# Patient Record
Sex: MALE | Race: WHITE | NOT HISPANIC OR LATINO | Employment: OTHER | URBAN - METROPOLITAN AREA
[De-identification: names, ages, dates, MRNs, and addresses within clinical notes are randomized per-mention and may not be internally consistent; named-entity substitution may affect disease eponyms.]

---

## 2017-02-07 ENCOUNTER — TELEPHONE (OUTPATIENT)
Dept: FAMILY MEDICINE | Facility: CLINIC | Age: 57
End: 2017-02-07

## 2017-02-07 NOTE — Clinical Note
[facility]  [address] [facility] [address]  Phone: [phone]    February 9, 2017    Mane Bro  [address] [place] [id number]              Dear Mane,    Your most recent blood pressure reading performed at our clinic was higher than we like to see it. The goal is to have it under 140/90 in clinic. Please call our clinic [phone] to schedule a blood pressure recheck on our RN schedule. This appointment is free of charge and takes about 15 minutes to complete. Be sure to take all of your blood pressure medications, and avoid stimulants like caffeine, cold medicines, sudafed, or tobacco prior to your recheck.    If your blood pressure medication was changed by your provider recently wait 2 weeks before making this recheck appointment.     Thank you for trusting us with your health care.  Sincerely,    Sharmaine Mora's Care Team

## 2017-02-08 NOTE — TELEPHONE ENCOUNTER
Left message for patient to return a call to the clinic GISELLA.   LAUREN Porter RN    Pt is on the hypertension lists.  He was seen once on 11/14/16; pt saw Dr Cohen prior to that.    Please offer pt a free blood pressure check with the RN.    BP Readings from Last 3 Encounters:   11/14/16 142/94   08/16/13 149/97   09/24/12 139/88     CR     0.89   9/24/2012  POTASSIUM      4.8   9/24/2012    Rosa Porter RN

## 2017-05-31 ENCOUNTER — HOSPITAL ENCOUNTER (EMERGENCY)
Facility: CLINIC | Age: 57
Discharge: HOME | End: 2017-05-31
Attending: NURSE PRACTITIONER | Admitting: NURSE PRACTITIONER
Payer: OTHER MISCELLANEOUS

## 2017-05-31 ENCOUNTER — APPOINTMENT (OUTPATIENT)
Dept: GENERAL RADIOLOGY | Facility: CLINIC | Age: 57
End: 2017-05-31
Attending: NURSE PRACTITIONER
Payer: OTHER MISCELLANEOUS

## 2017-05-31 VITALS
WEIGHT: 165 LBS | RESPIRATION RATE: 16 BRPM | BODY MASS INDEX: 21.77 KG/M2 | OXYGEN SATURATION: 97 % | DIASTOLIC BLOOD PRESSURE: 76 MMHG | TEMPERATURE: 98.5 F | SYSTOLIC BLOOD PRESSURE: 161 MMHG

## 2017-05-31 DIAGNOSIS — S49.91XA RIGHT SHOULDER INJURY, INITIAL ENCOUNTER: Primary | ICD-10-CM

## 2017-05-31 PROCEDURE — 73030 X-RAY EXAM OF SHOULDER: CPT | Mod: RT

## 2017-05-31 PROCEDURE — 99213 OFFICE O/P EST LOW 20 MIN: CPT

## 2017-05-31 PROCEDURE — 99213 OFFICE O/P EST LOW 20 MIN: CPT | Performed by: NURSE PRACTITIONER

## 2017-05-31 RX ORDER — HYDROCODONE BITARTRATE AND ACETAMINOPHEN 5; 325 MG/1; MG/1
1-2 TABLET ORAL EVERY 4 HOURS PRN
Qty: 15 TABLET | Refills: 0 | Status: SHIPPED | OUTPATIENT
Start: 2017-05-31 | End: 2017-08-25

## 2017-05-31 RX ORDER — AMLODIPINE BESYLATE 5 MG/1
5 TABLET ORAL DAILY
COMMUNITY
Start: 2016-11-15 | End: 2017-08-25

## 2017-05-31 RX ORDER — INDOMETHACIN 50 MG/1
50 CAPSULE ORAL PRN
COMMUNITY
Start: 2017-02-22

## 2017-05-31 NOTE — ED PROVIDER NOTES
History     Chief Complaint   Patient presents with     Shoulder Injury     rt shoulder injured yesterday at work lifting     HPI  Mane Bro is a 57 year old male who presents with right shoulder injury that occurred yesterday afternoon.  Pt is an .  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  Pt reports that he grabbed the muir with the manager and lifted it back up; then the muir slipped again and he felt immediate sharp pain on the inside of right shoulder.  Pt reports pain is getting worse and having difficulty with lifting.  Pt denies numbness and tingling of extremities.  Pt states at rest the pain level 3-4/10 and when using his arm the pain level is 12/10.   Pt has been taking 6 ibuprofen bid for pain management and this is not working well. Pt describes the pain as sharp, stabbing, throbbing and radiating down back.      I have reviewed the Medications, Allergies, Past Medical and Surgical History, and Social History in the Epic system.    Review of Systems   ROS: 10 point ROS neg other than the symptoms noted above in the HPI.    Physical Exam   /76  Temp 98.5  F (36.9  C)  Resp 16  Wt 74.8 kg (165 lb)  SpO2 97%  BMI 21.77 kg/m2    Physical Exam  Exam:  Constitutional: healthy, alert, no distress and moderate distress  Head: Normocephalic. No masses, lesions, tenderness or abnormalities  Neck: Neck supple. No adenopathy. Thyroid symmetric, normal size,  Cardiovascular: negative, PMI normal. No lifts, heaves, or thrills. RRR. No murmurs, clicks gallops or rub  Respiratory: negative, negative findings: normal respiratory rate and rhythm, lungs clear to auscultation  Musculoskeletal: extremities normal- no gross deformities noted, gait normal, normal muscle tone, normal range of motion to left should and right shoulder extension is limited, overhead reach is approximately 60 degrees, lift off  test is positive, tender to palpation anterior should with movement and posterior shoulder is tender with movement and AC joint is non tender and decreased strength to right arm compared to left  Skin: no suspicious lesions or rashes  Neurologic: Gait normal. Reflexes normal and symmetric. Sensation grossly WNL.  Psychiatric: mentation appears normal and affect normal/bright    ED Course     ED Course     Procedures    Critical Care time:  none     Labs Ordered and Resulted from Time of ED Arrival Up to the Time of Departure from the ED - No data to display   Results for orders placed or performed during the hospital encounter of 05/31/17   Shoulder XR, 2 view, right    Narrative    XR SHOULDER 2 VIEW RIGHT 5/31/2017 1:12 PM    HISTORY: Injury. Pain.    COMPARISON: None.      Impression    IMPRESSION: 2 views of the right shoulder show no fracture or  dislocation.     CHANTAL LYNCH MD       Assessments & Plan (with Medical Decision Making)     I have reviewed the nursing notes.    I have reviewed the findings, diagnosis, plan and need for follow up with the patient.  57 year old presents with 24 hours acute right shoulder injury work related.  ROM and strength decreased; neurovasculature intact.  Xrays appear negative.  Suspect rotator cuff pathology due to positive lift off on exam and remaining exam findings but cannot exclude other ligamentous or muscle pathology.  Referral to ortho initiated.  Off work until seen by ortho.  Given 3 day narcotic prescription for pain management.    Differential AC joint separation    New Prescriptions    HYDROCODONE-ACETAMINOPHEN (NORCO) 5-325 MG PER TABLET    Take 1-2 tablets by mouth every 4 hours as needed for moderate to severe pain       Final diagnoses:   Right shoulder injury, initial encounter       5/31/2017   [facility] EMERGENCY DEPARTMENT     Sharmaine White, KARON CNP  05/31/17 1400

## 2017-05-31 NOTE — ED AVS SNAPSHOT
[facility] Emergency Department    [address] [facility] [address]    Phone:  [phone]    Fax:  [phone]                                       Mane Bro   MRN: [id number]    Department:  [facility] Emergency Department   Date of Visit:  5/31/2017           After Visit Summary Signature Page     I have received my discharge instructions, and my questions have been answered. I have discussed any challenges I see with this plan with the nurse or doctor.    ..........................................................................................................................................  Patient/Patient Representative Signature      ..........................................................................................................................................  Patient Representative Print Name and Relationship to Patient    ..................................................               ................................................  Date                                            Time    ..........................................................................................................................................  Reviewed by Signature/Title    ...................................................              ..............................................  Date                                                            Time

## 2017-05-31 NOTE — ED AVS SNAPSHOT
[facility] Emergency Department    [address] [facility] [address]    Phone:  [phone]    Fax:  [phone]                                       Mane Bro   MRN: [id number]    Department:  [facility] Emergency Department   Date of Visit:  5/31/2017           Patient Information     Date Of Birth          1960        Your diagnoses for this visit were:     Right shoulder injury, initial encounter        You were seen by Sharmaine White APRN CNP.      Follow-up Information     Follow up In 1 day.    Why:  as directed by ortho concierage      Discharge References/Attachments     ROTATOR CUFF INJURIES, UNDERSTANDING (ENGLISH)      24 Hour Appointment Hotline       To make an appointment at any [facility] clinic, call [phone] ([phone]). If you don't have a family doctor or clinic, we will help you find one. [facility] clinics are conveniently located to serve the needs of you and your family.          ED Discharge Orders     ORTHO  REFERRAL       [facility] is referring you to the Orthopedic  Services at [facility].       The  Representative will assist you in the coordination of your Orthopedic and Musculoskeletal Care as prescribed by your physician.    The  Representative will call you within 1 business day to help schedule your appointment, or you may contact the  Representative at:    All areas ~ [phone]     Type of Referral : Surgical / Specialist  Possible rotator cuff        Timeframe requested: 1 - 2 days    Coverage of these services is subject to the terms and limitations of your health insurance plan.  Please call member services at your health plan with any benefit or coverage questions.      If X-rays, CT or MRI's have been performed, please contact the facility where they were done to arrange for , prior to your scheduled appointment.  Please bring this  referral request to your appointment and present it to your specialist.                     Review of your medicines      START taking        Dose / Directions Last dose taken    HYDROcodone-acetaminophen 5-325 MG per tablet   Commonly known as:  NORCO   Dose:  1-2 tablet   Quantity:  15 tablet        Take 1-2 tablets by mouth every 4 hours as needed for moderate to severe pain   Refills:  0          Our records show that you are taking the medicines listed below. If these are incorrect, please call your family doctor or clinic.        Dose / Directions Last dose taken    albuterol 90 MCG/ACT inhaler   Dose:  2 puff   Quantity:  1 Inhaler        Inhale 2 puffs into the lungs every 4 hours as needed for shortness of breath / dyspnea.   Refills:  0        amLODIPine 5 MG tablet   Commonly known as:  NORVASC   Dose:  5 mg        Take 5 mg by mouth daily   Refills:  0        ibuprofen 200 MG tablet   Commonly known as:  ADVIL/MOTRIN   Dose:  200 mg        Take 200 mg by mouth every 4 hours as needed   Refills:  0        indomethacin 50 MG capsule   Commonly known as:  INDOCIN   Dose:  50 mg        50 mg by Oral or NG Tube route as needed for pain   Refills:  0        metFORMIN 500 MG tablet   Commonly known as:  GLUCOPHAGE   Dose:  500 mg   Quantity:  60 tablet        Take 1 tablet by mouth 2 times daily (with meals).   Refills:  0        PRILOSEC OTC PO   Dose:  20 mg        Take 20 mg by mouth every other day. On odd days   Refills:  0        VIAGRA 100 MG cap/tab   Quantity:  10 Tab   Generic drug:  sildenafil        1/2 - 1  TABLET TAKEN AT LEAST 30 MINUTES BEFORE INTERCOURSE - max 100 mg in 24 hours   Refills:  1 YEAR                Prescriptions were sent or printed at these locations (1 Prescription)                   Other Prescriptions                Printed at Department/Unit printer (1 of 1)         HYDROcodone-acetaminophen (NORCO) 5-325 MG per tablet                Procedures and tests performed during your  "visit     Shoulder XR, 2 view, right      Orders Needing Specimen Collection     None      Pending Results     No orders found from 2017 to 2017.            Pending Culture Results     No orders found from 2017 to 2017.            Pending Results Instructions     If you had any lab results that were not finalized at the time of your Discharge, you can call the ED Lab Result RN at [phone]. You will be contacted by this team for any positive Lab results or changes in treatment. The nurses are available 7 days a week from 10A to 6:30P.  You can leave a message 24 hours per day and they will return your call.        Test Results From Your Hospital Stay        2017  1:42 PM      Narrative     XR SHOULDER 2 VIEW RIGHT 2017 1:12 PM    HISTORY: Injury. Pain.    COMPARISON: None.        Impression     IMPRESSION: 2 views of the right shoulder show no fracture or  dislocation.     CHANTAL LYNCH MD                Thank you for choosing [facility]       Thank you for choosing [facility] for your care. Our goal is always to provide you with excellent care. Hearing back from our patients is one way we can continue to improve our services. Please take a few minutes to complete the written survey that you may receive in the mail after you visit with us. Thank you!        [facility] [employer] Information     [employer] lets you send messages to your doctor, view your test results, renew your prescriptions, schedule appointments and more. To sign up, go to [email or web address] . Click on \"Log in\" on the left side of the screen, which will take you to the Welcome page. Then click on \"Sign up Now\" on the right side of the page.     You will be asked to enter the access code listed below, as well as some personal information. Please follow the directions to create your username and password.     Your access code is: [email or web address]  Expires: 2017  1:52 PM     Your access code will  in 90 days. If you need help or " a new code, please call your [facility] clinic or [phone].        Care EveryWhere ID     This is your Care EveryWhere ID. This could be used by other organizations to access your [facility] medical records  [id number]        After Visit Summary       This is your record. Keep this with you and show to your community pharmacist(s) and doctor(s) at your next visit.

## 2017-06-21 ENCOUNTER — HOSPITAL ENCOUNTER (OUTPATIENT)
Dept: PHYSICAL THERAPY | Facility: CLINIC | Age: 57
Setting detail: THERAPIES SERIES
End: 2017-06-21
Attending: ORTHOPAEDIC SURGERY
Payer: OTHER MISCELLANEOUS

## 2017-06-21 PROCEDURE — 40000718 ZZHC STATISTIC PT DEPARTMENT ORTHO VISIT: Performed by: PHYSICAL THERAPIST

## 2017-06-21 PROCEDURE — 97161 PT EVAL LOW COMPLEX 20 MIN: CPT | Mod: GP | Performed by: PHYSICAL THERAPIST

## 2017-06-21 NOTE — PROGRESS NOTES
Mane Bro   PHYSICAL THERAPY EVALUATION/[facility]    06/21/17 0700   General Information   Type of Visit Initial OP Ortho PT Evaluation   Start of Care Date 06/21/17   Referring Physician DR. Nuñez   Patient/Family Goals Statement get back to workl   Orders Evaluate and Treat   Date of Order 06/13/17   Insurance Type Other   Insurance Comments/Visits Authorized work comp   Medical Diagnosis R shoulder tendonitis   Body Part(s)   Body Part(s) Shoulder   Presentation and Etiology   Pertinent history of current problem (include personal factors and/or comorbidities that impact the POC) Lifting a 100# car muir with manager at work, he dropped his side, then lifted it again and the muir slipped.  Felt immediate pain inside R shoulder..  Did get injection 6/13 and shoulder feels back to 100%.  Paints cars, paint sprayer weighs about 10#.  HAs been off 2 wks, then working light duty, feels he could do his job fully..  did have MRI off site, showed partial tear..  Minimal pain now.    Onset date of current episode/exacerbation 05/30/17   Prior Level of Function   Functional Level Prior Comment work, indep living, fish   Current Level of Function   Patient role/employment history A. Employed   Employment Comments    Fall Risk Screen   Fall screen completed by PT   Per patient - Fall 2 or more times in past year? No   Per patient - Fall with injury in past year? No   Is patient a fall risk? No   Shoulder Objective Findings   Side (if bilateral, select both right and left) Right   Posture normal   Cervical Screen (ROM, quadrant) WNL   Shoulder Flexibility Comments no pain with any testing   Andrew-Horacio Test neg   Coracoid Test neg   Shoulder Special Tests Comments empty can neg   Right Shoulder Flexion AROM 150* B   Right Shoulder Flexion PROM 155* B   Right Shoulder Abduction AROM 140*B   Right Shoulder ER PROM 80* at 45abd   Right Shoulder IR PROM WNL behind back   Right Shoulder Flexion Strength  5   Right Shoulder Abduction Strength 5   Right Shoulder ER Strength 5   Right Shoulder IR Strength 5   Right Shoulder Extension Strength 5   Right Mid Trapezius Strength 5   Planned Therapy Interventions   Planned Therapy Interventions strengthening   Clinical Impression   Criteria for Skilled Therapeutic Interventions Met yes, treatment indicated   PT Diagnosis R shoulder injury, min pain   Functional limitations due to impairments none currently, not yet released to full duty   Clinical Presentation Stable/Uncomplicated   Clinical Decision Making (Complexity) Low complexity   Therapy Frequency 1 time/week   Predicted Duration of Therapy Intervention (days/wks) 1 visit   Risk & Benefits of therapy have been explained Yes   Patient, Family & other staff in agreement with plan of care Yes   Education Assessment   Preferred Learning Style Listening   Barriers to Learning No barriers   ORTHO GOALS   PT Ortho Eval Goals 1   Ortho Goal 1   Goal Description pt to be release to 25# lifting or full duty in 2wks   Target Date 07/05/17   Total Evaluation Time   Total Evaluation Time 12   Kris Hoenk, PT #[id number]  [facility]

## 2017-08-02 ENCOUNTER — TELEPHONE (OUTPATIENT)
Dept: FAMILY MEDICINE | Facility: CLINIC | Age: 57
End: 2017-08-02

## 2017-08-02 NOTE — LETTER
August 16, 2017        Mane Bro  [address] [place] [phone]        Dear Mane,     At this time you are due for a Colon Cancer Screening. Here is some information regarding this testing.     Recommended every 5-10 years, depending on your history, in order to prevent and detect colon cancer at its earliest stages.  Colon cancer is now the second leading cause of death in the [place] for both men and women and there are over 130,000 new cases and 50,000 deaths per year from colon cancer.  Colonoscopies can prevent 90-95% of these deaths.  Problem lesions can be removed before they ever become cancer. This test is not only looking for cancer, but also getting rid of precancerious lesions. You are usually given some sedation which makes the test very comfortable for most people.      If you do not wish to do a colonoscopy or cannot afford to do one, at this time, there is another option. It is called a FIT test or Fecal Immunochemical Occult Blood Test (take home stool sample kit).  It does not replace the colonoscopy for colorectal cancer screening, but it can detect hidden bleeding in the lower colon.  It does need to be repeated every year and if a positive result is obtained, you would be referred for a colonoscopy. The FIT test is really easy to do and does not require any  diet or medication restrictions and involves only one collection sample.      If you have completed either one of these tests or had a flexible sigmoidoscopy in the past five years at another facility, please have the records sent to our clinic so that we can best coordinate your care and update your chart.  Please call us if you have questions or would like arrange either to do a colonoscopy or obtain the necessary test kit for the Fecal Occult Test.        Sincerely,  KARON Britt CNP, APRN CNP

## 2017-08-02 NOTE — TELEPHONE ENCOUNTER
Pt is due for a colon cancer screening. Left a message for patient to return call.  Mague Rodriguez, CMA

## 2017-08-16 NOTE — TELEPHONE ENCOUNTER
Panel Management Review      Patient has the following on his problem list:     Diabetes    ASA: Failed    Last A1C  Lab Results   Component Value Date    A1C 5.5 09/19/2011     A1C tested: Passed    Last LDL:    No results found for: CHOL  No results found for: HDL  No results found for: LDL  No results found for: TRIG  No results found for: CHOLHDLRATIO  No results found for: NHDL    Is the patient on a Statin? NO             Is the patient on Aspirin? NO        Last three blood pressure readings:  BP Readings from Last 3 Encounters:   05/31/17 161/76   11/14/16 (!) 142/94   08/16/13 (!) 149/97       Date of last diabetes office visit: 11/14/2016     Tobacco History:     History   Smoking Status     Current Every Day Smoker     Packs/day: 0.10     Years: 40.00     Types: Cigarettes   Smokeless Tobacco     Never Used     Comment: 8/16/13 smoking about 3 cigs daily             Composite cancer screening  Chart review shows that this patient is due/due soon for the following Colonoscopy  Summary:    Patient is due/failing the following:   COLONOSCOPY    Action needed:   Due for Colon Cancer Screen    Type of outreach:    Sent letter.    Questions for provider review:    None                                                                                                                                    Og PICKARD CMA

## 2017-08-25 ENCOUNTER — OFFICE VISIT (OUTPATIENT)
Dept: FAMILY MEDICINE | Facility: CLINIC | Age: 57
End: 2017-08-25
Payer: COMMERCIAL

## 2017-08-25 VITALS
SYSTOLIC BLOOD PRESSURE: 188 MMHG | HEIGHT: 73 IN | HEART RATE: 94 BPM | DIASTOLIC BLOOD PRESSURE: 100 MMHG | BODY MASS INDEX: 20.63 KG/M2 | WEIGHT: 155.7 LBS | TEMPERATURE: 96.2 F

## 2017-08-25 DIAGNOSIS — I10 ESSENTIAL HYPERTENSION: ICD-10-CM

## 2017-08-25 DIAGNOSIS — E11.9 TYPE 2 DIABETES MELLITUS WITHOUT COMPLICATION, WITHOUT LONG-TERM CURRENT USE OF INSULIN (H): Primary | ICD-10-CM

## 2017-08-25 DIAGNOSIS — R63.4 WEIGHT LOSS: ICD-10-CM

## 2017-08-25 LAB
ANION GAP SERPL CALCULATED.3IONS-SCNC: 6 MMOL/L (ref 3–14)
BASOPHILS # BLD AUTO: 0 10E9/L (ref 0–0.2)
BASOPHILS NFR BLD AUTO: 0.6 %
BUN SERPL-MCNC: 8 MG/DL (ref 7–30)
CALCIUM SERPL-MCNC: 9.1 MG/DL (ref 8.5–10.1)
CHLORIDE SERPL-SCNC: 103 MMOL/L (ref 94–109)
CO2 SERPL-SCNC: 27 MMOL/L (ref 20–32)
CREAT SERPL-MCNC: 0.66 MG/DL (ref 0.66–1.25)
DIFFERENTIAL METHOD BLD: NORMAL
EOSINOPHIL # BLD AUTO: 0.3 10E9/L (ref 0–0.7)
EOSINOPHIL NFR BLD AUTO: 5.2 %
ERYTHROCYTE [DISTWIDTH] IN BLOOD BY AUTOMATED COUNT: 12.4 % (ref 10–15)
GFR SERPL CREATININE-BSD FRML MDRD: >90 ML/MIN/1.7M2
GLUCOSE SERPL-MCNC: 271 MG/DL (ref 70–99)
HBA1C MFR BLD: 13.1 % (ref 4.3–6)
HCT VFR BLD AUTO: 46.4 % (ref 40–53)
HGB BLD-MCNC: 15.9 G/DL (ref 13.3–17.7)
LYMPHOCYTES # BLD AUTO: 1.6 10E9/L (ref 0.8–5.3)
LYMPHOCYTES NFR BLD AUTO: 32.8 %
MCH RBC QN AUTO: 33 PG (ref 26.5–33)
MCHC RBC AUTO-ENTMCNC: 34.3 G/DL (ref 31.5–36.5)
MCV RBC AUTO: 96 FL (ref 78–100)
MONOCYTES # BLD AUTO: 0.5 10E9/L (ref 0–1.3)
MONOCYTES NFR BLD AUTO: 10.7 %
NEUTROPHILS # BLD AUTO: 2.5 10E9/L (ref 1.6–8.3)
NEUTROPHILS NFR BLD AUTO: 50.7 %
PLATELET # BLD AUTO: 162 10E9/L (ref 150–450)
POTASSIUM SERPL-SCNC: 4.5 MMOL/L (ref 3.4–5.3)
RBC # BLD AUTO: 4.82 10E12/L (ref 4.4–5.9)
SODIUM SERPL-SCNC: 136 MMOL/L (ref 133–144)
TSH SERPL DL<=0.005 MIU/L-ACNC: 0.74 MU/L (ref 0.4–4)
WBC # BLD AUTO: 4.9 10E9/L (ref 4–11)

## 2017-08-25 PROCEDURE — 99214 OFFICE O/P EST MOD 30 MIN: CPT | Performed by: NURSE PRACTITIONER

## 2017-08-25 PROCEDURE — 83036 HEMOGLOBIN GLYCOSYLATED A1C: CPT | Performed by: NURSE PRACTITIONER

## 2017-08-25 PROCEDURE — 85025 COMPLETE CBC W/AUTO DIFF WBC: CPT | Performed by: NURSE PRACTITIONER

## 2017-08-25 PROCEDURE — 80048 BASIC METABOLIC PNL TOTAL CA: CPT | Performed by: NURSE PRACTITIONER

## 2017-08-25 PROCEDURE — 36415 COLL VENOUS BLD VENIPUNCTURE: CPT | Performed by: NURSE PRACTITIONER

## 2017-08-25 PROCEDURE — 84443 ASSAY THYROID STIM HORMONE: CPT | Performed by: NURSE PRACTITIONER

## 2017-08-25 RX ORDER — AMLODIPINE BESYLATE 5 MG/1
5 TABLET ORAL DAILY
Qty: 30 TABLET | Refills: 1 | Status: SHIPPED | OUTPATIENT
Start: 2017-08-25

## 2017-08-25 NOTE — NURSING NOTE
"Chief Complaint   Patient presents with     Establish Care     Weight Problem     Has lost about 50 pounds the past 6 months and has not changed his diet.       Initial BP (!) 195/121  Pulse 94  Temp 96.2  F (35.7  C) (Tympanic)  Ht 6' 1\" (1.854 m)  Wt 155 lb 11.2 oz (70.6 kg)  BMI 20.54 kg/m2 Estimated body mass index is 20.54 kg/(m^2) as calculated from the following:    Height as of this encounter: 6' 1\" (1.854 m).    Weight as of this encounter: 155 lb 11.2 oz (70.6 kg).  Medication Reconciliation: complete  "

## 2017-08-25 NOTE — MR AVS SNAPSHOT
"              After Visit Summary   8/25/2017    Mane Bro    MRN: [id number]           Patient Information     Date Of Birth          1960        Visit Information        Provider Department      8/25/2017 6:40 AM Yesika Oliva APRN CNP [facility]        Today's Diagnoses     Type 2 diabetes mellitus without complication, without long-term current use of insulin (H)    -  1    Weight loss        Essential hypertension          Care Instructions    Labs: thyroid, kidney function, A1C, complete blood count.     Please restart Norvasc 5 mg daily and monitor your blood pressure, if still over 140/80, please follow up.           Follow-ups after your visit        Future tests that were ordered for you today     Open Future Orders        Priority Expected Expires Ordered    CBC with platelets differential Routine  9/25/2017 8/25/2017    XR Chest 2 Views Routine 8/25/2017 8/25/2018 8/25/2017            Who to contact     If you have questions or need follow up information about today's clinic visit or your schedule please contact [facility] directly at [phone].  Normal or non-critical lab and imaging results will be communicated to you by MyChart, letter or phone within 4 business days after the clinic has received the results. If you do not hear from us within 7 days, please contact the clinic through Citizen.VChart or phone. If you have a critical or abnormal lab result, we will notify you by phone as soon as possible.  Submit refill requests through BuyItRideIt or call your pharmacy and they will forward the refill request to us. Please allow 3 business days for your refill to be completed.          Additional Information About Your Visit        MyChart Information     [employer]It[employer]It lets you send messages to your doctor, view your test results, renew your prescriptions, schedule appointments and more. To sign up, go to [email or web address] . Click on \"Log in\" on the left side " "of the screen, which will take you to the Welcome page. Then click on \"Sign up Now\" on the right side of the page.     You will be asked to enter the access code listed below, as well as some personal information. Please follow the directions to create your username and password.     Your access code is: [email or web address]  Expires: 2017  1:52 PM     Your access code will  in 90 days. If you need help or a new code, please call your [place] clinic or [phone].        Care EveryWhere ID     This is your Care EveryWhere ID. This could be used by other organizations to access your [place] medical records  [id number]        Your Vitals Were     Pulse Temperature Height BMI (Body Mass Index)          94 96.2  F (35.7  C) (Tympanic) 6' 1\" (1.854 m) 20.54 kg/m2         Blood Pressure from Last 3 Encounters:   17 (!) 195/121   17 161/76   16 (!) 142/94    Weight from Last 3 Encounters:   17 155 lb 11.2 oz (70.6 kg)   17 165 lb (74.8 kg)   16 209 lb (94.8 kg)              We Performed the Following     Basic metabolic panel     Hemoglobin A1c     TSH with free T4 reflex          Where to get your medicines      These medications were sent to [facility] [address] [place] [address] [address]   [address] [facility] [address]     Phone:  [phone]     amLODIPine 5 MG tablet          Primary Care Provider    Provider Unknown       No address on file        Equal Access to Services     [facility] : Hadbatsheva Cadet, waaxda luqadaha, qaybta kaalhenrry silva. So [facility] [phone].    ATENCIÓN: Si habla español, tiene a pratt disposición servicios gratuitos de asistencia lingüística. Llame al [phone].    We comply with applicable federal civil rights laws and [place] laws. We do not discriminate on the basis of race, color, national origin, age, disability sex, sexual orientation or gender " identity.            Thank you!     Thank you for choosing [facility]  for your care. Our goal is always to provide you with excellent care. Hearing back from our patients is one way we can continue to improve our services. Please take a few minutes to complete the written survey that you may receive in the mail after your visit with us. Thank you!             Your Updated Medication List - Protect others around you: Learn how to safely use, store and throw away your medicines at www.disposemymeds.org.          This list is accurate as of: 8/25/17  7:04 AM.  Always use your most recent med list.                   Brand Name Dispense Instructions for use Diagnosis    albuterol 90 MCG/ACT inhaler     1 Inhaler    Inhale 2 puffs into the lungs every 4 hours as needed for shortness of breath / dyspnea.        amLODIPine 5 MG tablet    NORVASC    30 tablet    Take 1 tablet (5 mg) by mouth daily    Essential hypertension       HYDROcodone-acetaminophen 5-325 MG per tablet    NORCO    15 tablet    Take 1-2 tablets by mouth every 4 hours as needed for moderate to severe pain    Right shoulder injury, initial encounter       ibuprofen 200 MG tablet    ADVIL/MOTRIN     Take 200 mg by mouth every 4 hours as needed        indomethacin 50 MG capsule    INDOCIN     50 mg by Oral or NG Tube route as needed for pain        metFORMIN 500 MG tablet    GLUCOPHAGE    60 tablet    Take 1 tablet by mouth 2 times daily (with meals).        PRILOSEC OTC PO      Take 20 mg by mouth every other day. On odd days        VIAGRA 100 MG cap/tab   Generic drug:  sildenafil     10 Tab    1/2 - 1  TABLET TAKEN AT LEAST 30 MINUTES BEFORE INTERCOURSE - max 100 mg in 24 hours    Impotence of organic origin

## 2017-08-25 NOTE — PATIENT INSTRUCTIONS
Labs: thyroid, kidney function, A1C, complete blood count.     Please restart Norvasc 5 mg daily and monitor your blood pressure, if still over 140/80, please follow up.

## 2017-08-25 NOTE — PROGRESS NOTES
"  SUBJECTIVE:   Mane Bro is a 57 year old male who presents to clinic today for the following health issues: unintentional weight loss, over 50 lbs the past 6 months, has not change his diet, but reports that sometimes he is \"laizy to eat\" and skips breakfasts. He has history of diabetes type 2, on Metformin which he takes sporadically. Does not check blood sugars regularly, but reports BG ranging from 125-140.  History of hypertension, uncontrolled, does not take prescribed Norvasc, states \"I don't like to take pills\". Current smoker, tried to quit multiple times unsuccessfully   The patient complains of chronic morning productive cough, denies shortness of breath , wheezing and chest pain.  Denies fever, chills, diaphoresis.   Denies abdominal pain, nausea, diarrhea, blood in stools and hematuria. Denies weakness, dizziness, headaches and blurry vision.     Problem list and histories reviewed & adjusted, as indicated.  Additional history: as documented    Labs reviewed in EPIC    Reviewed and updated as needed this visit by clinical staff  Tobacco  Allergies  Problems  Med Hx  Surg Hx  Fam Hx  Soc Hx      Reviewed and updated as needed this visit by Provider  Problems         ROS:  Constitutional, HEENT, cardiovascular, pulmonary, gi and gu systems are negative, except as otherwise noted.      OBJECTIVE:   BP (!) 188/100  Pulse 94  Temp 96.2  F (35.7  C) (Tympanic)  Ht 6' 1\" (1.854 m)  Wt 155 lb 11.2 oz (70.6 kg)  BMI 20.54 kg/m2  Body mass index is 20.54 kg/(m^2).  GENERAL: healthy, alert and no distress  EYES: Eyes grossly normal to inspection, PERRL and conjunctivae and sclerae normal  NECK: no adenopathy, no asymmetry, masses, or scars and thyroid normal to palpation  RESP: occasional inspiratory wheezes, otherwise CTA   CV: regular rate and rhythm, normal S1 S2, no S3 or S4, no murmur, click or rub, no peripheral edema and peripheral pulses strong  ABDOMEN: soft, nontender, no " hepatosplenomegaly, no masses and bowel sounds normal  PSYCH: mentation appears normal, affect normal/bright    Diagnostic Test Results:  Lab Results   Component Value Date    A1C 13.1 08/25/2017    A1C 5.5 09/19/2011     TSH with reflex-pending   BMP-pending   Lab Results   Component Value Date    WBC 4.9 08/25/2017     Lab Results   Component Value Date    RBC 4.82 08/25/2017     Lab Results   Component Value Date    HGB 15.9 08/25/2017     Lab Results   Component Value Date    HCT 46.4 08/25/2017     No components found for: MCT  Lab Results   Component Value Date    MCV 96 08/25/2017     Lab Results   Component Value Date    MCH 33.0 08/25/2017     Lab Results   Component Value Date    MCHC 34.3 08/25/2017     Lab Results   Component Value Date    RDW 12.4 08/25/2017     Lab Results   Component Value Date     08/25/2017         ASSESSMENT/PLAN:     1. Type 2 diabetes mellitus without complication, without long-term current use of insulin (H)  -uncontrolled due to non-compliance, which might explain his weight loss  - Basic metabolic panel  - Hemoglobin A1c  -restart Metformin 1000 mg twice daily and follow up in 1 month    2. Weight loss  -can be due to uncontrolled diabetes  -he is smoker, recommended chest Xray to rule out malignancy, but patient refused  - Basic metabolic panel  - TSH with free T4 reflex  - Fecal colorectal cancer screen (FIT); Future  - CBC with platelets differential    3. Essential hypertension  -uncontrolled   - restart amLODIPine (NORVASC) 5 MG tablet; Take 1 tablet (5 mg) by mouth daily  Dispense: 30 tablet; Refill: 1  -monitor BP   -follow up in 1 month, or sooner if BP still over 00572    See Patient Instructions    KARON Hsieh [facility]

## 2017-11-16 ENCOUNTER — TELEPHONE (OUTPATIENT)
Dept: FAMILY MEDICINE | Facility: CLINIC | Age: 57
End: 2017-11-16

## 2017-11-16 NOTE — TELEPHONE ENCOUNTER
"11/16/2017      Patient Communication Preferences indicate  Do not contact  and/or communication by \"Phone\" is not preferred. Call not required per Outreach team.      Outreach ,  Curtis Cueto        "

## 2019-08-09 ENCOUNTER — OFFICE VISIT (OUTPATIENT)
Dept: FAMILY MEDICINE | Facility: CLINIC | Age: 59
End: 2019-08-09
Payer: MEDICAID

## 2019-08-09 VITALS
RESPIRATION RATE: 20 BRPM | DIASTOLIC BLOOD PRESSURE: 98 MMHG | TEMPERATURE: 98 F | HEART RATE: 104 BPM | SYSTOLIC BLOOD PRESSURE: 156 MMHG

## 2019-08-09 DIAGNOSIS — E11.9 TYPE 2 DIABETES MELLITUS TREATED WITHOUT INSULIN (H): ICD-10-CM

## 2019-08-09 DIAGNOSIS — F17.200 TOBACCO USE DISORDER: ICD-10-CM

## 2019-08-09 DIAGNOSIS — R05.9 COUGH: Primary | ICD-10-CM

## 2019-08-09 DIAGNOSIS — I10 ESSENTIAL HYPERTENSION, BENIGN: ICD-10-CM

## 2019-08-09 LAB
BASOPHILS # BLD AUTO: 0 10E9/L (ref 0–0.2)
BASOPHILS NFR BLD AUTO: 0.5 %
DIFFERENTIAL METHOD BLD: ABNORMAL
EOSINOPHIL # BLD AUTO: 0.2 10E9/L (ref 0–0.7)
EOSINOPHIL NFR BLD AUTO: 2.3 %
ERYTHROCYTE [DISTWIDTH] IN BLOOD BY AUTOMATED COUNT: 12.7 % (ref 10–15)
HCT VFR BLD AUTO: 46.4 % (ref 40–53)
HGB BLD-MCNC: 16.3 G/DL (ref 13.3–17.7)
LYMPHOCYTES # BLD AUTO: 1.7 10E9/L (ref 0.8–5.3)
LYMPHOCYTES NFR BLD AUTO: 20.2 %
MCH RBC QN AUTO: 34.4 PG (ref 26.5–33)
MCHC RBC AUTO-ENTMCNC: 35.1 G/DL (ref 31.5–36.5)
MCV RBC AUTO: 98 FL (ref 78–100)
MONOCYTES # BLD AUTO: 0.9 10E9/L (ref 0–1.3)
MONOCYTES NFR BLD AUTO: 10.4 %
NEUTROPHILS # BLD AUTO: 5.5 10E9/L (ref 1.6–8.3)
NEUTROPHILS NFR BLD AUTO: 66.6 %
PLATELET # BLD AUTO: 175 10E9/L (ref 150–450)
RBC # BLD AUTO: 4.74 10E12/L (ref 4.4–5.9)
WBC # BLD AUTO: 8.3 10E9/L (ref 4–11)

## 2019-08-09 PROCEDURE — 99214 OFFICE O/P EST MOD 30 MIN: CPT | Performed by: FAMILY MEDICINE

## 2019-08-09 PROCEDURE — 85025 COMPLETE CBC W/AUTO DIFF WBC: CPT | Performed by: FAMILY MEDICINE

## 2019-08-09 PROCEDURE — 36415 COLL VENOUS BLD VENIPUNCTURE: CPT | Performed by: FAMILY MEDICINE

## 2019-08-09 RX ORDER — HYDROCHLOROTHIAZIDE 25 MG/1
25 TABLET ORAL DAILY
Qty: 90 TABLET | Refills: 3 | Status: SHIPPED | OUTPATIENT
Start: 2019-08-09

## 2019-08-09 ASSESSMENT — ENCOUNTER SYMPTOMS
HEMATOLOGIC/LYMPHATIC NEGATIVE: 1
HALLUCINATIONS: 0
FATIGUE: 0
EYE DISCHARGE: 0
DIZZINESS: 0
SEIZURES: 0
DECREASED CONCENTRATION: 0
FEVER: 0
NERVOUS/ANXIOUS: 0
COLOR CHANGE: 0
APPETITE CHANGE: 0
EYE PAIN: 0
GASTROINTESTINAL NEGATIVE: 1
SHORTNESS OF BREATH: 0
COUGH: 1
PALPITATIONS: 0
CONFUSION: 0
HEADACHES: 0
AGITATION: 0
ALLERGIC/IMMUNOLOGIC NEGATIVE: 1
ENDOCRINE NEGATIVE: 1
ACTIVITY CHANGE: 0
WHEEZING: 0

## 2019-08-09 ASSESSMENT — PAIN SCALES - GENERAL: PAINLEVEL: NO PAIN (0)

## 2019-08-09 NOTE — PROGRESS NOTES
Subjective     Mane Bro is a 59 year old male who presents to clinic today for the following health issues:    HPI     Chief Complaint   Patient presents with     Infection     Dog diagnosed with blastomycosis from spores in wood in back yard. Advised by  to be checked because he could have contracted it as well.      Patient Active Problem List   Diagnosis     Impotence of organic origin     Essential hypertension, benign     Tobacco use disorder     widened mediastinum     CARDIOVASCULAR SCREENING; LDL GOAL LESS THAN 130     Ganglion, joint     Type 2 diabetes mellitus treated without insulin (H)     Past Surgical History:   Procedure Laterality Date     SURGICAL HISTORY OF -   1980    left wrist fractures surgically repaired     SURGICAL HISTORY OF -   as child    hernia repair - R? - as child     SURGICAL HISTORY OF -   1994ish    vasectomy       Social History     Tobacco Use     Smoking status: Current Every Day Smoker     Packs/day: 0.10     Years: 40.00     Pack years: 4.00     Types: Cigarettes     Smokeless tobacco: Never Used     Tobacco comment: 1-1.5 packs per day   Substance Use Topics     Alcohol use: Yes     Comment: drinks daily     Family History   Problem Relation Age of Onset     Cancer Mother      Cerebrovascular Disease Father      Hypertension Father      Heart Disease Paternal Grandfather      Anesthesia Reaction No family hx of          Current Outpatient Medications   Medication Sig Dispense Refill     albuterol 90 MCG/ACT inhaler Inhale 2 puffs into the lungs every 4 hours as needed for shortness of breath / dyspnea. (Patient not taking: Reported on 8/25/2017) 1 Inhaler 0     amLODIPine (NORVASC) 5 MG tablet Take 1 tablet (5 mg) by mouth daily 30 tablet 1     ibuprofen (ADVIL,MOTRIN) 200 MG tablet Take 200 mg by mouth every 4 hours as needed       indomethacin (INDOCIN) 50 MG capsule 50 mg by Oral or NG Tube route as needed for pain       metFORMIN (GLUCOPHAGE) 1000 MG  tablet Take 1 tablet (1,000 mg) by mouth 2 times daily (with meals) 60 tablet 1     Omeprazole Magnesium (PRILOSEC OTC PO) Take 20 mg by mouth every other day. On odd days        VIAGRA 100 MG PO TABS 1/2 - 1  TABLET TAKEN AT LEAST 30 MINUTES BEFORE INTERCOURSE - max 100 mg in 24 hours (Patient not taking: No sig reported) 10 Tab 1 YEAR     Allergies   Allergen Reactions     Cefzil [Cefprozil] Nausea and Vomiting, Diarrhea and Difficulty breathing       Reviewed and updated as needed this visit by Provider    1. Bronchitis f/u: Diagnosed yesterday.  Treat with levaquin.  Ongoing for the past 6-8 weeks. Dog was recently diagnosed with blastomycosis.  Symptoms are improving.  Patient does smoke.     2. Hypertension: Was previously on amlodipine and lisinopril.  Made him feel tired and not thinking clearly.  He continues to smoke and does not want to quit smoking at this itme.    3. Diabetes: He stopped taking his metformin.     Review of Systems   Constitutional: Negative for activity change, appetite change, fatigue and fever.   HENT: Negative.    Eyes: Negative for pain, discharge and visual disturbance.   Respiratory: Positive for cough. Negative for shortness of breath and wheezing.    Cardiovascular: Negative for chest pain, palpitations and leg swelling.   Gastrointestinal: Negative.    Endocrine: Negative.    Genitourinary: Negative.    Skin: Negative for color change and rash.   Allergic/Immunologic: Negative.    Neurological: Negative for dizziness, seizures and headaches.   Hematological: Negative.    Psychiatric/Behavioral: Negative for agitation, confusion, decreased concentration and hallucinations. The patient is not nervous/anxious.            Objective    BP (!) 156/98 (BP Location: Right arm, Patient Position: Sitting, Cuff Size: Adult Regular)   Pulse 104   Temp 98  F (36.7  C) (Tympanic)   Resp 20   There is no height or weight on file to calculate BMI.  Physical Exam   Constitutional: He is  oriented to person, place, and time. He appears well-developed and well-nourished. No distress.   HENT:   Head: Normocephalic and atraumatic.   Nose: Nose normal.   Eyes: Conjunctivae and EOM are normal.   Neck: Normal range of motion. No tracheal deviation present.   Cardiovascular: Normal rate, regular rhythm and normal heart sounds.   Pulmonary/Chest: Effort normal. He has wheezes.   Musculoskeletal: Normal range of motion.   Neurological: He is alert and oriented to person, place, and time.   Skin: No rash noted. No erythema.   Psychiatric: He has a normal mood and affect. His behavior is normal.             Component      Latest Ref Rng & Units 9/19/2011 8/25/2017   Hemoglobin A1C      4.3 - 6.0 % 5.5 13.1 (H)       Assessment & Plan     1. Cough  Advised to complete levaquin course  - CBC with platelets and differential  - Sputum Culture Aerobic Bacterial; Future  - Fungus Culture, non-blood; Future    2. Essential hypertension, benign  Patient is currently not taking any medications due to prior side effects.   - hydrochlorothiazide (HYDRODIURIL) 25 MG tablet; Take 1 tablet (25 mg) by mouth daily  Dispense: 90 tablet; Refill: 3    3. Type 2 diabetes mellitus treated without insulin (H)  Currently not on any medications.     4. Tobacco use disorder  Declines treatment at this time.      Tobacco Cessation:   reports that he has been smoking cigarettes.  He has a 4.00 pack-year smoking history. He has never used smokeless tobacco.  Tobacco Cessation Action Plan: Information offered: Patient not interested at this time        See Patient Instructions    No follow-ups on file.    Adrian Cross DO  [facility]

## 2019-08-09 NOTE — PATIENT INSTRUCTIONS
Humza Bro,    Thank you for allowing [facility] to manage your care.    I ordered some blood work and sputum culture, please go to the laboratory to get your laboratory studies.    I sent your prescriptions to your pharmacy.    Please allow 1-2 business days for our office to call you in regards to your laboratory/radiological studies.  If not done so, I encourage you to login into [email or web address] ([email or web address] to review your results as well.     If you have any questions or concerns, please feel free to call us at [phone].    Sincerely,    Dr. Cross

## 2019-08-12 DIAGNOSIS — R05.9 COUGH: ICD-10-CM

## 2019-08-13 DIAGNOSIS — R05.9 COUGH: ICD-10-CM

## 2019-08-13 LAB
BACTERIA SPEC CULT: ABNORMAL
BACTERIA SPEC CULT: ABNORMAL
FUNGUS SPEC CULT: ABNORMAL
FUNGUS SPEC CULT: ABNORMAL
GRAM STN SPEC: ABNORMAL
SPECIMEN SOURCE: ABNORMAL

## 2019-08-13 PROCEDURE — 87205 SMEAR GRAM STAIN: CPT | Performed by: FAMILY MEDICINE

## 2019-10-05 ENCOUNTER — HEALTH MAINTENANCE LETTER (OUTPATIENT)
Age: 59
End: 2019-10-05

## 2019-10-23 ENCOUNTER — TELEPHONE (OUTPATIENT)
Dept: FAMILY MEDICINE | Facility: CLINIC | Age: 59
End: 2019-10-23

## 2019-10-23 NOTE — TELEPHONE ENCOUNTER
Panel Management Review      Patient has the following on his problem list:     Diabetes    ASA: Failed    Last A1C  Lab Results   Component Value Date    A1C 13.1 08/25/2017    A1C 5.5 09/19/2011     A1C tested: FAILED    Last LDL:    No results found for: CHOL  No results found for: HDL  No results found for: LDL  No results found for: TRIG  No results found for: CHOLHDLRATIO  No results found for: NHDL    Is the patient on a Statin? NO             Is the patient on Aspirin? NO        Last three blood pressure readings:  BP Readings from Last 3 Encounters:   08/09/19 (!) 156/98   08/25/17 (!) 188/100   05/31/17 161/76       Date of last diabetes office visit: 3/19/2018     Tobacco History:     History   Smoking Status     Current Every Day Smoker     Packs/day: 0.10     Years: 40.00     Types: Cigarettes   Smokeless Tobacco     Never Used     Comment: 1-1.5 packs per day         Hypertension   Last three blood pressure readings:  BP Readings from Last 3 Encounters:   08/09/19 (!) 156/98   08/25/17 (!) 188/100   05/31/17 161/76     Blood pressure: FAILED    HTN Guidelines:  Less than 140/90      Composite cancer screening  Chart review shows that this patient is due/due soon for the following Colonoscopy  Summary:    Patient is due/failing the following:   LABS, A1C, BP CHECK, COLONOSCOPY and PHYSICAL    Action needed:   Patient needs office visit for physical, diabetes, hypertension.    Type of outreach:    Sent TheFriendMail message.    Questions for provider review:    None                                                                                                                                    Emelyn Bullock CMA on 10/23/2019 at 1:11 PM       Chart routed to none .

## 2020-01-02 ENCOUNTER — TELEPHONE (OUTPATIENT)
Dept: FAMILY MEDICINE | Facility: CLINIC | Age: 60
End: 2020-01-02

## 2020-01-02 NOTE — LETTER
January 2, 2020      Mane Bro  [address] [place] [id number]        Dear Mane,     As part of [facility]'s commitment to health and wellness we have reviewed your chart and it indicates that you are due for one or more of the following:    -Annual physical  -Diabetes visit  -Blood pressure check    Please try to schedule and/or complete the tests above within the next 2-4 weeks.   The number to call to schedule an appointment at [facility] is [phone].    While we work hard to maintain accurate records, it is always possible that this notice does not accurately reflect tests that you may have had. To ensure that we do not send you unnecessary notices please verify that we have accurate dates of your tests (even if these were done many years ago) or if you are seeking care at another clinic.      Sincerely,     [facility]

## 2020-01-02 NOTE — TELEPHONE ENCOUNTER
Panel Management Review      Patient has the following on his problem list:     Diabetes    ASA: Failed    Last A1C  Lab Results   Component Value Date    A1C 13.1 08/25/2017    A1C 5.5 09/19/2011     A1C tested: FAILED    Last LDL:    No results found for: CHOL  No results found for: HDL  No results found for: LDL  No results found for: TRIG  No results found for: CHOLHDLRATIO  No results found for: NHDL    Is the patient on a Statin? NO             Is the patient on Aspirin? NO        Last three blood pressure readings:  BP Readings from Last 3 Encounters:   08/09/19 (!) 156/98   08/25/17 (!) 188/100   05/31/17 161/76       Date of last diabetes office visit: 8/9/19     Tobacco History:     History   Smoking Status     Current Every Day Smoker     Packs/day: 0.10     Years: 40.00     Types: Cigarettes   Smokeless Tobacco     Never Used     Comment: 1-1.5 packs per day         Hypertension   Last three blood pressure readings:  BP Readings from Last 3 Encounters:   08/09/19 (!) 156/98   08/25/17 (!) 188/100   05/31/17 161/76     Blood pressure: FAILED    HTN Guidelines:  Less than 140/90      Composite cancer screening  Chart review shows that this patient is due/due soon for the following Colonoscopy  Summary:    Patient is due/failing the following:   LABS, HYPERTENSION, DIABETES, COLONOSCOPY and PHYSICAL    Action needed:   Patient needs office visit for physical, blood pressure and diabetes.    Type of outreach:    Sent letter.    Questions for provider review:    None                                                                                                                                    Emelyn Bullock CMA on 1/2/2020 at 11:40 AM       Chart routed to none .

## 2020-11-14 ENCOUNTER — HEALTH MAINTENANCE LETTER (OUTPATIENT)
Age: 60
End: 2020-11-14

## 2020-11-19 ENCOUNTER — TELEPHONE (OUTPATIENT)
Dept: FAMILY MEDICINE | Facility: CLINIC | Age: 60
End: 2020-11-19

## 2020-11-19 NOTE — TELEPHONE ENCOUNTER
Pt returned from [place], states he had a stroke while he was there, had no medical attention for this.  Symptoms: L arm weakness that has not improved.  SOB, Dizziness.  Pt is diabetic but has not taken meds for this or has not checked his BS for 2 yrs.  Last time seen here was on 8/16/13.  Recommended ER, pt agreed.  KPavelRCRAIG

## 2020-11-20 ENCOUNTER — APPOINTMENT (OUTPATIENT)
Dept: CT IMAGING | Facility: CLINIC | Age: 60
End: 2020-11-20
Attending: EMERGENCY MEDICINE
Payer: COMMERCIAL

## 2020-11-20 ENCOUNTER — HOSPITAL ENCOUNTER (EMERGENCY)
Facility: CLINIC | Age: 60
Discharge: HOME | End: 2020-11-20
Attending: EMERGENCY MEDICINE | Admitting: EMERGENCY MEDICINE
Payer: COMMERCIAL

## 2020-11-20 VITALS
BODY MASS INDEX: 22.43 KG/M2 | TEMPERATURE: 99.3 F | HEART RATE: 85 BPM | OXYGEN SATURATION: 95 % | RESPIRATION RATE: 11 BRPM | SYSTOLIC BLOOD PRESSURE: 147 MMHG | DIASTOLIC BLOOD PRESSURE: 112 MMHG | WEIGHT: 170 LBS

## 2020-11-20 DIAGNOSIS — I10 BENIGN ESSENTIAL HYPERTENSION: ICD-10-CM

## 2020-11-20 DIAGNOSIS — R07.89 ATYPICAL CHEST PAIN: ICD-10-CM

## 2020-11-20 DIAGNOSIS — R20.2 PARESTHESIA: ICD-10-CM

## 2020-11-20 DIAGNOSIS — R63.4 WEIGHT LOSS: ICD-10-CM

## 2020-11-20 DIAGNOSIS — E11.9 TYPE 2 DIABETES MELLITUS WITHOUT COMPLICATION, WITHOUT LONG-TERM CURRENT USE OF INSULIN (H): ICD-10-CM

## 2020-11-20 DIAGNOSIS — I10 ESSENTIAL HYPERTENSION: ICD-10-CM

## 2020-11-20 LAB
ANION GAP SERPL CALCULATED.3IONS-SCNC: 9 MMOL/L (ref 3–14)
BASOPHILS # BLD AUTO: 0 10E9/L (ref 0–0.2)
BASOPHILS NFR BLD AUTO: 0.5 %
BUN SERPL-MCNC: 13 MG/DL (ref 7–30)
CALCIUM SERPL-MCNC: 9.2 MG/DL (ref 8.5–10.1)
CHLORIDE SERPL-SCNC: 98 MMOL/L (ref 94–109)
CO2 SERPL-SCNC: 23 MMOL/L (ref 20–32)
CREAT SERPL-MCNC: 0.67 MG/DL (ref 0.66–1.25)
DIFFERENTIAL METHOD BLD: ABNORMAL
EOSINOPHIL # BLD AUTO: 0.1 10E9/L (ref 0–0.7)
EOSINOPHIL NFR BLD AUTO: 1.5 %
ERYTHROCYTE [DISTWIDTH] IN BLOOD BY AUTOMATED COUNT: 12.1 % (ref 10–15)
GFR SERPL CREATININE-BSD FRML MDRD: >90 ML/MIN/{1.73_M2}
GLUCOSE BLDC GLUCOMTR-MCNC: 423 MG/DL (ref 70–99)
GLUCOSE SERPL-MCNC: 426 MG/DL (ref 70–99)
HCT VFR BLD AUTO: 45.9 % (ref 40–53)
HGB BLD-MCNC: 15.8 G/DL (ref 13.3–17.7)
IMM GRANULOCYTES # BLD: 0 10E9/L (ref 0–0.4)
IMM GRANULOCYTES NFR BLD: 0.5 %
LYMPHOCYTES # BLD AUTO: 1.4 10E9/L (ref 0.8–5.3)
LYMPHOCYTES NFR BLD AUTO: 17.5 %
MCH RBC QN AUTO: 33.3 PG (ref 26.5–33)
MCHC RBC AUTO-ENTMCNC: 34.4 G/DL (ref 31.5–36.5)
MCV RBC AUTO: 97 FL (ref 78–100)
MONOCYTES # BLD AUTO: 0.7 10E9/L (ref 0–1.3)
MONOCYTES NFR BLD AUTO: 9 %
NEUTROPHILS # BLD AUTO: 5.8 10E9/L (ref 1.6–8.3)
NEUTROPHILS NFR BLD AUTO: 71 %
NRBC # BLD AUTO: 0 10*3/UL
NRBC BLD AUTO-RTO: 0 /100
PLATELET # BLD AUTO: 174 10E9/L (ref 150–450)
POTASSIUM SERPL-SCNC: 4 MMOL/L (ref 3.4–5.3)
RBC # BLD AUTO: 4.74 10E12/L (ref 4.4–5.9)
SODIUM SERPL-SCNC: 130 MMOL/L (ref 133–144)
TROPONIN I SERPL-MCNC: <0.015 UG/L (ref 0–0.04)
WBC # BLD AUTO: 8.2 10E9/L (ref 4–11)

## 2020-11-20 PROCEDURE — 93010 ELECTROCARDIOGRAM REPORT: CPT | Performed by: EMERGENCY MEDICINE

## 2020-11-20 PROCEDURE — 93005 ELECTROCARDIOGRAM TRACING: CPT | Performed by: EMERGENCY MEDICINE

## 2020-11-20 PROCEDURE — 71250 CT THORAX DX C-: CPT

## 2020-11-20 PROCEDURE — 99285 EMERGENCY DEPT VISIT HI MDM: CPT | Mod: 25 | Performed by: EMERGENCY MEDICINE

## 2020-11-20 PROCEDURE — C9803 HOPD COVID-19 SPEC COLLECT: HCPCS | Performed by: EMERGENCY MEDICINE

## 2020-11-20 PROCEDURE — 84484 ASSAY OF TROPONIN QUANT: CPT | Performed by: EMERGENCY MEDICINE

## 2020-11-20 PROCEDURE — 80048 BASIC METABOLIC PNL TOTAL CA: CPT | Performed by: EMERGENCY MEDICINE

## 2020-11-20 PROCEDURE — 999N001017 HC STATISTIC GLUCOSE BY METER IP

## 2020-11-20 PROCEDURE — 85025 COMPLETE CBC W/AUTO DIFF WBC: CPT | Performed by: EMERGENCY MEDICINE

## 2020-11-20 RX ORDER — AMLODIPINE BESYLATE 5 MG/1
5 TABLET ORAL DAILY
Qty: 30 TABLET | Refills: 0 | Status: SHIPPED | OUTPATIENT
Start: 2020-11-20 | End: 2020-12-20

## 2020-11-20 NOTE — ED NOTES
Agree with triage note. Pt states he was in [place] 1 month ago and had a stroke but was not evaluated for it. States he knows it was a stroke because his dad had two. He said his vision went blurry, arm went numb, and had difficultly walking. He states he hasn't felt well since. Chest pain since then that's constant, midsternal, no radiation. No nausea/vomiting. Has had a loss of appetite and reports weight loss. Normal heart tones. Wheezes heard in lungs. Pt is a smoker. Pt has diabetes and a sugar of 423 today, states he doesn't take meds because he can't afford them. Pt is hypertensive, does take BP meds, but only has two left. Pt had EKG done today - sinus tachy with T wave abnormality.

## 2020-11-20 NOTE — ED NOTES
Pt is not willing to wait for his paperwork - IV discontinued - patient ambulated out of department

## 2020-11-20 NOTE — ED PROVIDER NOTES
History     Chief Complaint   Patient presents with     Chest Pain     diabetic type II     HPI  Mane Bro is a 60 year old male with history of DM2, hypertension, tobacco use disorder, who presents the emergency department with concern that he had a stroke and evaluation of chest pain.  Patient has been staying in [place] recently helping a family member and does not have any healthcare coverage there.  Has not been evaluated by a physician in some time.  Has not been taking Metformin for his diabetes however has been taking blood pressure medication but is almost out.  Approximately 1 months ago he said his vision was blurry lasted for a few seconds.  Also has a tingling sensation in his left hand.  No return of visual symptoms however sensation in his small ring and long finger have remained.  Patient's father had a stroke which was his concern and he was treated has been constant for greater than 3 weeks, midsternal patient nonradiating, not usually dyspnea, nausea, vomiting, or diaphoresis.  No provocative or palliating features.  No loss of consciousness or lightheadedness.  No muscle weakness reported.  No prior history of coronary artery disease.  Patient is a current smoker.  Reports 20 to 30 pound unintentional weight loss over past several months.    The patient's PMHx, Surgical Hx, Allergies, and Medications were all reviewed with the patient.    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 Provider to review and confirm       Tobacco use disorder 07/09/2007     Priority: Medium     Started chantix.  Smoking a little less than 2 ppd, since 14-15 yo.        Essential hypertension, benign 06/25/2007     Priority: Medium     presistently elevated - had tolerated lisinopril in past - w decent bp control - rrestarted lisinopril 10 mg daily - doing well currently.  No problems with med.          Impotence of organic origin 06/13/2007     Priority: Medium     good response from viagra - refill.          Past Medical History:    Past Medical History:   Diagnosis Date     BENIGN HYPERTENSION 6/25/2007     COUGH 6/6/2006     ESOPHAGEAL REFLUX 6/25/2007     TOBACCO USE DISORDER 7/9/2007     Unspecified sinusitis (chronic)        Past Surgical History:    Past Surgical History:   Procedure Laterality Date     SURGICAL HISTORY OF -   1980    left wrist fractures surgically repaired     SURGICAL HISTORY OF -   as child    hernia repair - R? - as child     SURGICAL HISTORY OF -   1994ish    vasectomy       Family History:    Family History   Problem Relation Age of Onset     Cancer Mother      Cerebrovascular Disease Father      Hypertension Father      Heart Disease Paternal Grandfather      Anesthesia Reaction No family hx of        Social History:  Marital Status:   [2]  Social History     Tobacco Use     Smoking status: Current Every Day Smoker     Packs/day: 0.10     Years: 40.00     Pack years: 4.00     Types: Cigarettes     Smokeless tobacco: Never Used     Tobacco comment: 1-1.5 packs per day   Substance Use Topics     Alcohol use: Yes     Comment: drinks daily     Drug use: No        Medications:         amLODIPine (NORVASC) 5 MG tablet       metFORMIN (GLUCOPHAGE) 500 MG tablet       albuterol 90 MCG/ACT inhaler       amLODIPine (NORVASC) 5 MG tablet       hydrochlorothiazide (HYDRODIURIL) 25 MG tablet       ibuprofen (ADVIL,MOTRIN) 200 MG tablet       indomethacin (INDOCIN) 50 MG  capsule       metFORMIN (GLUCOPHAGE) 1000 MG tablet       Omeprazole Magnesium (PRILOSEC OTC PO)       VIAGRA 100 MG PO TABS          Review of Systems   Constitutional: Negative for chills and fever.   HENT: Negative for sore throat.    Eyes: Negative for visual disturbance.   Respiratory: Negative for cough and shortness of breath.    Cardiovascular: Positive for chest pain. Negative for palpitations and leg swelling.   Gastrointestinal: Negative for abdominal pain, diarrhea, nausea and vomiting.   Genitourinary: Negative for dysuria.   Musculoskeletal: Negative for back pain.   Skin: Negative for rash.   Neurological: Positive for numbness. Negative for syncope, speech difficulty, light-headedness and headaches.       Physical Exam   BP: (!) 184/100  Pulse: 105  Temp: 99.3  F (37.4  C)  Resp: 15  Weight: 77.1 kg (170 lb)  SpO2: 98 %    Physical Exam  GEN: Awake, alert, and cooperative. No acute distress, resting comfortably in recumbent position on cart  HENT: MMM. External ears and nose normal bilaterally.  Atraumatic  EYES: EOM intact. Conjunctiva clear. No discharge.  No RAPD.  No nystagmus.  Visual fields intact to direct confrontation.  NECK: Supple, symmetric.  Nontender.  CV : Regular rate and rhythm.  No murmurs appreciated  PULM: Normal effort.  Subtle expiratory wheezes heard in bilateral lung fields, no rales or rhonchi.  No accessory muscle usage.  ABD: Soft, non-tender, non-distended. No rebound or guarding.   NEURO: Mental status: Alert, awake. Oriented to self, date, and place. Normal speech and language. GCS 15  Cranial Nerves: II-XII intact  Motor: Follows commands x 4 extremities   Upper Extremities:   RUE: 5/5 shoulder abduction. 5/5 elbow flex/ext. 5/5 wrist flex/ext. 5/5 hand .   LUE: 5/5 shoulder abduction. 5/5 elbow flex/ext. 5/5 wrist flex/ext. 5/5 hand .    Lower Extremities:   RLE: 5/5 hip flexion. 5/5 knee flex/ext. 5/5 ankle plantar-/dorsiflexion. 5/5 EHL.   LLE: 5/5 hip  flexion. 5/5 knee flex/ext. 5/5 ankle plantar-/dorsiflexion. 5/5 EHL   Sensory: Sensation intact in all 4 extremities   Coordination: Finger-nose finger intact. Heel-shin intact.    EXT: No gross deformity.  No pitting lower extremity edema  INT: Warm. No diaphoresis. Normal color.        ED Course        Procedures             EKG Interpretation:      Interpreted by Roberto Elena MD  Time reviewed: 12:30  Symptoms at time of EKG: CP   Rhythm: sinus tachycardia  Rate: 104  Axis: Left Axis Deviation  Ectopy: none  Conduction: normal  ST Segments/ T Waves: Poor R wave progression  Q Waves: anterior leads and inferior leads  Comparison to prior: No old EKG available    Clinical Impression: Abnormal ECG. 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        Critical Care time:  none               Results for orders placed or performed during the hospital encounter of 11/20/20 (from the past 24 hour(s))   CBC with platelets differential   Result Value Ref Range    WBC 8.2 4.0 - 11.0 10e9/L    RBC Count 4.74 4.4 - 5.9 10e12/L    Hemoglobin 15.8 13.3 - 17.7 g/dL    Hematocrit 45.9 40.0 - 53.0 %    MCV 97 78 - 100 fl    MCH 33.3 (H) 26.5 - 33.0 pg    MCHC 34.4 31.5 - 36.5 g/dL    RDW 12.1 10.0 - 15.0 %    Platelet Count 174 150 - 450 10e9/L    Diff Method Automated Method     % Neutrophils 71.0 %    % Lymphocytes 17.5 %    % Monocytes 9.0 %    % Eosinophils 1.5 %    % Basophils 0.5 %    % Immature Granulocytes 0.5 %    Nucleated RBCs 0 0 /100    Absolute Neutrophil 5.8 1.6 - 8.3 10e9/L    Absolute Lymphocytes 1.4 0.8 - 5.3 10e9/L    Absolute Monocytes 0.7 0.0 - 1.3 10e9/L    Absolute Eosinophils 0.1 0.0 - 0.7 10e9/L    Absolute Basophils 0.0 0.0 - 0.2 10e9/L    Abs Immature Granulocytes 0.0 0 - 0.4 10e9/L    Absolute Nucleated RBC 0.0    Basic metabolic panel   Result Value Ref Range    Sodium 130 (L) 133 - 144 mmol/L    Potassium 4.0 3.4 - 5.3 mmol/L    Chloride 98 94 - 109 mmol/L    Carbon Dioxide 23 20 - 32 mmol/L    Anion  Gap 9 3 - 14 mmol/L    Glucose 426 (H) 70 - 99 mg/dL    Urea Nitrogen 13 7 - 30 mg/dL    Creatinine 0.67 0.66 - 1.25 mg/dL    GFR Estimate >90 >60 mL/min/[1.73_m2]    GFR Estimate If Black >90 >60 mL/min/[1.73_m2]    Calcium 9.2 8.5 - 10.1 mg/dL   Troponin I   Result Value Ref Range    Troponin I ES <0.015 0.000 - 0.045 ug/L   Glucose by meter   Result Value Ref Range    Glucose 423 (H) 70 - 99 mg/dL   Chest CT w/o contrast    Narrative    CT CHEST WITHOUT CONTRAST 11/20/2020 3:21 PM     HISTORY: Anterior chest pain. Long smoking history and 30 pound weight  loss.    TECHNIQUE: Helical axial scans from lung apices through lung bases  without contrast. Radiation dose for this scan was reduced using  automated exposure control, adjustment of the mA and/or kV according  to patient size, or iterative reconstruction technique.    COMPARISON: None.     FINDINGS: Vascular calcifications, including coronary artery  calcifications. The mediastinal and hilar structures otherwise appear  normal without contrast. There is a 1 cm nodule with central  calcification in the left posterior medial costophrenic angle region  consistent with old granulomatous disease. There is a 2 mm nodule  which is almost certainly calcified in the left lower lobe posteriorly  (image 159 of series 5). There is an incomplete ring type density in  the right upper lobe anteriorly measuring almost 5 mm. This is likely  a small focus of pulmonary scarring. No suspicious nodules are  identified. No infiltrates. Continuous anterior endplate spurring  throughout most of the thoracic spine suggesting diffuse idiopathic  skeletal hyperostosis and clinical correlation is recommended.  Visualized upper abdomen without contrast is unremarkable.      Impression    IMPRESSION:  1. No acute appearing abnormality in the chest.  2. Vascular calcifications, including coronary artery calcifications.  3. Old granulomatous disease.  4. Possible diffuse idiopathic skeletal  hyperostosis in the thoracic  spine.     GARRETT RAHMAN MD       Medications - No data to display    Assessments & Plan (with Medical Decision Making)   60 year old male with past medical history significant for untreated diabetes type 2, hypertension, tobacco use, with paresthesias of left hand and 3 weeks of chest pain not associated with dyspnea, diaphoresis, nausea or vomiting.  On arrival to Emergency Department, vital signs were blood pressure 184/100, temperature 99.3, pulse 105, respiratory rate 15, SPO2 98% on room air.    Initial evaluation patient did not appear to be in any acute distress.  Reassuring neurologic exam with no focal motor or sensory deficits.  Patient does describe paresthesias in his long, ring, and small finger of his left hand.  This does not fit ulnar or any single nerve distribution.  Normal strength and function.  No clumsiness of hand.  It is possible that his transient blurring of vision was less than seconds could be a TIA but no residual deficits and I have a lower suspicion.  Without any sensory loss or motor loss and no other symptoms my suspicion for stroke remains low.  Regarding his chest pain, no provocative or palliating features, not exertional, not associated with dyspnea, no acute ischemic changes on ECG and normal troponin met me less concerned for acute ACS.  He says that his pain has been present for weeks, specifically the last 12 hours and would expect elevation of troponin.  ECG does show possible old inferior-able infarct.  He may well had an old event but 3 weeks of constant symptoms would be unlikely without a troponin leak and his EKG findings today.   He is a smoker and has some mild expiratory wheezing.  No infectious symptoms or cough, no history of asthma.  Could be due to smoking and patient counseled on smoking cessation.  Given his weight loss, smoking history, and dual chest pain, is concerned for possible mass in his chest and a CT scan was obtained.   CT results did not show any acute findings to explain his symptoms.  Image reviewed personally.     Patient will need additional stratification and will need establish care with primary care provider in [place].  I do not feel he needs hospital admission at this time or specialty consultation.  He was written a prescription for Metformin and amlodipine.  Recommend follows up with the medicine clinic to establish care and for evaluation management of his poorly controlled diabetes and hypertension.  Can discuss further cardiac risk stratification at that time.  Patient is wanting to be discharged from the emergency department.  All results discussed with patient.  Discharged in stable condition.    I have reviewed the nursing notes.         Discharge Medication List as of 11/20/2020  4:47 PM      START taking these medications    Details   !! amLODIPine (NORVASC) 5 MG tablet Take 1 tablet (5 mg) by mouth daily, Disp-30 tablet, R-0, E-Prescribe      !! metFORMIN (GLUCOPHAGE) 500 MG tablet Take 1 tablet (500 mg) by mouth 2 times daily (with meals), Disp-60 tablet, R-0, E-Prescribe       !! - Potential duplicate medications found. Please discuss with provider.          Final diagnoses:   Atypical chest pain   Weight loss   Paresthesia   Benign essential hypertension   Type 2 diabetes mellitus without complication, without long-term current use of insulin (H)     Roberto Elena MD    11/20/2020   [facility] EMERGENCY DEPT    Disclaimer: This note consists of words and symbols derived from keyboarding and dictation using voice recognition software.  As a result, there may be errors that have gone undetected.  Please consider this when interpreting information found in this note.             Roberto Elena MD  11/23/20 [id number]

## 2020-11-20 NOTE — ED TRIAGE NOTES
Pt states he had a stroke 1 month ago but was not seen. Had double vision for 3 min and left arm weakness which never resolved. Did not have insurance. Has had left chest pain with arm defecit since stroke and chest pain is worsening. Also has untreated diabetes due to no insurance.

## 2020-11-20 NOTE — DISCHARGE INSTRUCTIONS
Your evaluation emergency department today was reassuring.  Will be very important that you follow-up with a primary care provider to establish care and for further evaluation and management of your chronic diseases.  A prescription for Metformin amlodipine has been sent to preferred pharmacy.  If you have any worsening chest pain, shortness of breath, or develop other new concerning signs or symptoms, you should return to the emergency department for further evaluation and treatment.

## 2020-11-23 ASSESSMENT — ENCOUNTER SYMPTOMS
VOMITING: 0
SHORTNESS OF BREATH: 0
SORE THROAT: 0
ABDOMINAL PAIN: 0
NUMBNESS: 1
BACK PAIN: 0
DYSURIA: 0
CHILLS: 0
NAUSEA: 0
PALPITATIONS: 0
LIGHT-HEADEDNESS: 0
COUGH: 0
FEVER: 0
SPEECH DIFFICULTY: 0
HEADACHES: 0
DIARRHEA: 0

## 2021-05-23 ENCOUNTER — HEALTH MAINTENANCE LETTER (OUTPATIENT)
Age: 61
End: 2021-05-23

## 2021-09-12 ENCOUNTER — HEALTH MAINTENANCE LETTER (OUTPATIENT)
Age: 61
End: 2021-09-12

## 2022-01-02 ENCOUNTER — HEALTH MAINTENANCE LETTER (OUTPATIENT)
Age: 62
End: 2022-01-02

## 2022-08-14 ENCOUNTER — HEALTH MAINTENANCE LETTER (OUTPATIENT)
Age: 62
End: 2022-08-14

## 2022-11-19 ENCOUNTER — HEALTH MAINTENANCE LETTER (OUTPATIENT)
Age: 62
End: 2022-11-19

## 2023-04-09 ENCOUNTER — HEALTH MAINTENANCE LETTER (OUTPATIENT)
Age: 63
End: 2023-04-09

## 2023-11-18 ENCOUNTER — HEALTH MAINTENANCE LETTER (OUTPATIENT)
Age: 63
End: 2023-11-18

## 2024-05-04 ENCOUNTER — APPOINTMENT (OUTPATIENT)
Dept: GENERAL RADIOLOGY | Facility: CLINIC | Age: 64
End: 2024-05-04
Payer: COMMERCIAL

## 2024-05-04 ENCOUNTER — HOSPITAL ENCOUNTER (EMERGENCY)
Facility: CLINIC | Age: 64
Discharge: HOME | End: 2024-05-04
Payer: COMMERCIAL

## 2024-05-04 VITALS
SYSTOLIC BLOOD PRESSURE: 153 MMHG | HEART RATE: 54 BPM | DIASTOLIC BLOOD PRESSURE: 62 MMHG | OXYGEN SATURATION: 97 % | RESPIRATION RATE: 16 BRPM | TEMPERATURE: 99 F

## 2024-05-04 DIAGNOSIS — J06.9 UPPER RESPIRATORY INFECTION: ICD-10-CM

## 2024-05-04 LAB
FLUAV RNA SPEC QL NAA+PROBE: NEGATIVE
FLUBV RNA RESP QL NAA+PROBE: NEGATIVE
GROUP A STREP BY PCR: NOT DETECTED
RSV RNA SPEC NAA+PROBE: NEGATIVE
SARS-COV-2 RNA RESP QL NAA+PROBE: NEGATIVE

## 2024-05-04 PROCEDURE — 87637 SARSCOV2&INF A&B&RSV AMP PRB: CPT

## 2024-05-04 PROCEDURE — G0463 HOSPITAL OUTPT CLINIC VISIT: HCPCS | Mod: 25

## 2024-05-04 PROCEDURE — 71046 X-RAY EXAM CHEST 2 VIEWS: CPT

## 2024-05-04 PROCEDURE — 99204 OFFICE O/P NEW MOD 45 MIN: CPT

## 2024-05-04 PROCEDURE — 87651 STREP A DNA AMP PROBE: CPT

## 2024-05-04 RX ORDER — LEVOFLOXACIN 750 MG/1
750 TABLET, FILM COATED ORAL DAILY
Qty: 5 TABLET | Refills: 0 | Status: SHIPPED | OUTPATIENT
Start: 2024-05-04 | End: 2024-05-09

## 2024-05-04 RX ORDER — PREDNISONE 20 MG/1
TABLET ORAL
Qty: 10 TABLET | Refills: 0 | Status: SHIPPED | OUTPATIENT
Start: 2024-05-04

## 2024-05-04 RX ORDER — ALBUTEROL SULFATE 90 UG/1
2 AEROSOL, METERED RESPIRATORY (INHALATION) EVERY 6 HOURS PRN
Qty: 18 G | Refills: 0 | Status: SHIPPED | OUTPATIENT
Start: 2024-05-04

## 2024-05-04 ASSESSMENT — ACTIVITIES OF DAILY LIVING (ADL): ADLS_ACUITY_SCORE: 35

## 2024-05-04 ASSESSMENT — COLUMBIA-SUICIDE SEVERITY RATING SCALE - C-SSRS
2. HAVE YOU ACTUALLY HAD ANY THOUGHTS OF KILLING YOURSELF IN THE PAST MONTH?: NO
1. IN THE PAST MONTH, HAVE YOU WISHED YOU WERE DEAD OR WISHED YOU COULD GO TO SLEEP AND NOT WAKE UP?: NO
6. HAVE YOU EVER DONE ANYTHING, STARTED TO DO ANYTHING, OR PREPARED TO DO ANYTHING TO END YOUR LIFE?: NO

## 2024-05-04 NOTE — ED PROVIDER NOTES
History       HPI  Mane Bro is a 63 year old male with past medical history significant for smoking and asthma who presents to urgent care with chief complaint of flu like symptoms. Pt reports body aches, fever, cough, and sore throat began 4 days ago.  Patient denies nausea, vomiting, rashes, dyspnea, shortness of breath, otalgia, headaches, vision changes.  Patient reports azithromycin does not work for him.  Tried over-the-counter cold and flu medications without relief.    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 Provider to review and confirm      Tobacco use disorder 07/09/2007     Priority: Medium     Started chantix.  Smoking a little less than 2 ppd, since 14-15 yo.       Essential hypertension, benign 06/25/2007     Priority: Medium     presistently elevated - had tolerated lisinopril in past - w decent bp control - rrestarted lisinopril 10 mg daily - doing well currently.  No problems with med.         Impotence of organic origin 06/13/2007     Priority: Medium     good response from viagra - refill.          Past Medical History:    Past Medical History:   Diagnosis Date    BENIGN HYPERTENSION 6/25/2007    COUGH 6/6/2006    ESOPHAGEAL REFLUX 6/25/2007    TOBACCO USE DISORDER 7/9/2007    Unspecified sinusitis (chronic)        Past Surgical History:    Past Surgical History:   Procedure Laterality Date    IR LOWER EXTREMITY ANGIOGRAM RIGHT  6/14/2023    SURGICAL HISTORY OF -    1980    left wrist fractures surgically repaired    SURGICAL HISTORY OF -   as child    hernia repair - R? - as child    SURGICAL HISTORY OF -   1994ish    vasectomy       Family History:    Family History   Problem Relation Age of Onset    Cancer Mother     Cerebrovascular Disease Father     Hypertension Father     Heart Disease Paternal Grandfather     Anesthesia Reaction No family hx of        Social History:  Marital Status:   [2]  Social History     Tobacco Use    Smoking status: Every Day     Current packs/day: 0.10     Average packs/day: 0.1 packs/day for 40.0 years (4.0 ttl pk-yrs)     Types: Cigarettes    Smokeless tobacco: Never    Tobacco comments:     1-1.5 packs per day   Substance Use Topics    Alcohol use: Yes     Comment: drinks daily    Drug use: No        Medications:    albuterol (PROAIR HFA/PROVENTIL HFA/VENTOLIN HFA) 108 (90 Base) MCG/ACT inhaler  levofloxacin (LEVAQUIN) 750 MG tablet  predniSONE (DELTASONE) 20 MG tablet  albuterol 90 MCG/ACT inhaler  amLODIPine (NORVASC) 5 MG tablet  hydrochlorothiazide (HYDRODIURIL) 25 MG tablet  ibuprofen (ADVIL,MOTRIN) 200 MG tablet  indomethacin (INDOCIN) 50 MG capsule  metFORMIN (GLUCOPHAGE) 1000 MG tablet  Omeprazole Magnesium (PRILOSEC OTC PO)  VIAGRA 100 MG PO TABS          Review of Systems    Pertinent ROS in HPI, all other systems reviewed and are negative.    Physical Exam   BP: (!) 153/62  Pulse: 54  Temp: 99  F (37.2  C)  Resp: 16  SpO2: 97 %      Physical Exam  Vitals and nursing note reviewed.   Constitutional:       General: He is not in acute distress.     Appearance: He is not ill-appearing, toxic-appearing or diaphoretic.   HENT:      Head: Normocephalic.      Right Ear: External ear normal. There is impacted cerumen.      Left Ear: External ear normal. There is impacted cerumen.      Nose: No congestion or rhinorrhea.      Mouth/Throat:      Pharynx: Posterior oropharyngeal erythema present.   Cardiovascular:      Rate and Rhythm:  Normal rate and regular rhythm.      Pulses: Normal pulses.      Heart sounds: Normal heart sounds.   Pulmonary:      Effort: No respiratory distress.      Breath sounds: No stridor. No wheezing, rhonchi or rales.      Comments: Decreased breath sounds lower lobes  Musculoskeletal:      Cervical back: Neck supple. No rigidity.   Lymphadenopathy:      Cervical: No cervical adenopathy.   Skin:     Findings: No rash.   Neurological:      General: No focal deficit present.      Mental Status: He is alert.      Sensory: No sensory deficit.   Psychiatric:         Mood and Affect: Mood normal.         Behavior: Behavior normal.         ED Course          Results for orders placed or performed during the hospital encounter of 05/04/24 (from the past 24 hour(s))   Symptomatic Influenza A/B, RSV, & SARS-CoV2 PCR (COVID-19) Nose    Specimen: Nose; Swab   Result Value Ref Range    Influenza A PCR Negative Negative    Influenza B PCR Negative Negative    RSV PCR Negative Negative    SARS CoV2 PCR Negative Negative    Narrative    Testing was performed using the Xpert Xpress CoV2/Flu/RSV Assay on the [employer] GeneXpert Instrument. This test should be ordered for the detection of SARS-CoV-2, influenza, and RSV viruses in individuals who meet clinical and/or epidemiological criteria. Test performance is unknown in asymptomatic patients. This test is for in vitro diagnostic use under the FDA EUA for laboratories certified under CLIA to perform high or moderate complexity testing. This test has not been FDA cleared or approved. A negative result does not rule out the presence of PCR inhibitors in the specimen or target RNA in concentration below the limit of detection for the assay. If only one viral target is positive but coinfection with multiple targets is suspected, the sample should be re-tested with another FDA cleared, approved, or authorized test, if coinfection would change clinical management. This test was validated by the  [facility]. These laboratories are certified under the Clinical Laboratory Improvement Amendments of 1988 (CLIA-88) as qualified to perform high complexity laboratory testing.   Group A Streptococcus PCR Throat Swab    Specimen: Throat; Swab   Result Value Ref Range    Group A strep by PCR Not Detected Not Detected    Narrative    The Xpert Xpress Strep A test, performed on the [employer], is a rapid, qualitative in vitro diagnostic test for the detection of Streptococcus pyogenes (Group A ß-hemolytic Streptococcus, Strep A) in throat swab specimens from patients with signs and symptoms of pharyngitis. The Xpert Xpress Strep A test can be used as an aid in the diagnosis of Group A Streptococcal pharyngitis. The assay is not intended to monitor treatment for Group A Streptococcus infections. The Xpert Xpress Strep A test utilizes an automated real-time polymerase chain reaction (PCR) to detect Streptococcus pyogenes DNA.   XR Chest 2 Views    Narrative    EXAM: XR CHEST 2 VIEWS  LOCATION: [facility]  DATE: 5/4/2024    INDICATION: Decreased breath sound lower right  COMPARISON: None.      Impression    IMPRESSION: Negative chest.       Medications - No data to display    Assessments & Plan (with Medical Decision Making)     I have reviewed the nursing notes.    I have reviewed the findings, diagnosis, plan and need for follow up with the patient.      Medical Decision Making  Pt is a 63 year old male with past medical history significant for smoking and asthma who presents to urgent care with chief complaint of flu like symptoms. Pt reports body aches, fever, cough, and sore throat began 4 days ago.  Patient denies nausea, vomiting, rashes, dyspnea, shortness of breath, otalgia, headaches, vision changes.  Chest x-ray obtained and personally reviewed revealing: No cardiopulmonary abnormalities, no pneumonia.    Group A strep and viral swabs obtained  revealing negative group A strep, influenza, RSV, COVID-19.    Given prolonged cough, age, and history of smoking with asthma we will treat patient with albuterol inhaler, prednisone, levofloxacin.  Patient is aware levofloxacin along with prednisone may increase likelihood of tendon rupture.  Patient would like to proceed with therapy.  I recommended no lifting or exercise over the next 2 weeks.    I did recommend Z-Harshal over levofloxacin, however patient states Z-Harshal does not work for him and he would prefer Levaquin.    Patient should follow-up in 3 days if symptoms worsen or do not improve.    Patient is agreeable to plan and all questions were answered.  Patient discharged home.      New Prescriptions    ALBUTEROL (PROAIR HFA/PROVENTIL HFA/VENTOLIN HFA) 108 (90 BASE) MCG/ACT INHALER    Inhale 2 puffs into the lungs every 6 hours as needed for shortness of breath, wheezing or cough    LEVOFLOXACIN (LEVAQUIN) 750 MG TABLET    Take 1 tablet (750 mg) by mouth daily for 5 days    PREDNISONE (DELTASONE) 20 MG TABLET    Take two tablets (= 40mg) each day for 5 (five) days       Final diagnoses:   Upper respiratory infection       5/4/2024   [facility] EMERGENCY DEPT       Carly Salazar PA-C  05/04/24 [id number]

## 2024-05-04 NOTE — DISCHARGE INSTRUCTIONS
Follow-up if symptoms worsen or do not improve over next 3-5 days. You will have higher risk of tendon rupture, do not perform lifting activities or exercise over the next 2 weeks.

## 2024-06-15 ENCOUNTER — HEALTH MAINTENANCE LETTER (OUTPATIENT)
Age: 64
End: 2024-06-15

## 2024-12-29 ENCOUNTER — HEALTH MAINTENANCE LETTER (OUTPATIENT)
Age: 64
End: 2024-12-29

## 2025-05-27 ENCOUNTER — TRANSCRIBE ORDERS (OUTPATIENT)
Dept: OTHER | Age: 65
End: 2025-05-27

## 2025-06-11 ENCOUNTER — HOSPITAL ENCOUNTER (OUTPATIENT)
Dept: CARDIAC REHAB | Facility: CLINIC | Age: 65
Discharge: HOME | End: 2025-06-11
Attending: FAMILY MEDICINE
Payer: COMMERCIAL

## 2025-06-11 PROCEDURE — 93797 PHYS/QHP OP CAR RHAB WO ECG: CPT

## 2025-06-11 PROCEDURE — 93798 PHYS/QHP OP CAR RHAB W/ECG: CPT

## 2025-06-18 ENCOUNTER — HOSPITAL ENCOUNTER (OUTPATIENT)
Dept: CARDIAC REHAB | Facility: CLINIC | Age: 65
Discharge: HOME | End: 2025-06-18
Attending: FAMILY MEDICINE
Payer: COMMERCIAL

## 2025-06-18 PROCEDURE — 93798 PHYS/QHP OP CAR RHAB W/ECG: CPT

## 2025-06-20 ENCOUNTER — HOSPITAL ENCOUNTER (OUTPATIENT)
Dept: CARDIAC REHAB | Facility: CLINIC | Age: 65
Discharge: HOME | End: 2025-06-20
Attending: FAMILY MEDICINE
Payer: COMMERCIAL

## 2025-06-20 PROCEDURE — 93798 PHYS/QHP OP CAR RHAB W/ECG: CPT | Performed by: REHABILITATION PRACTITIONER

## 2025-06-25 ENCOUNTER — HOSPITAL ENCOUNTER (OUTPATIENT)
Dept: CARDIAC REHAB | Facility: CLINIC | Age: 65
Discharge: HOME | End: 2025-06-25
Attending: FAMILY MEDICINE
Payer: COMMERCIAL

## 2025-06-25 PROCEDURE — 93798 PHYS/QHP OP CAR RHAB W/ECG: CPT

## 2025-06-27 ENCOUNTER — HOSPITAL ENCOUNTER (OUTPATIENT)
Dept: CARDIAC REHAB | Facility: CLINIC | Age: 65
Discharge: HOME | End: 2025-06-27
Attending: FAMILY MEDICINE
Payer: COMMERCIAL

## 2025-06-27 PROCEDURE — 93798 PHYS/QHP OP CAR RHAB W/ECG: CPT

## 2025-07-02 ENCOUNTER — HOSPITAL ENCOUNTER (OUTPATIENT)
Dept: CARDIAC REHAB | Facility: CLINIC | Age: 65
Discharge: HOME | End: 2025-07-02
Attending: FAMILY MEDICINE
Payer: COMMERCIAL

## 2025-07-02 PROCEDURE — 93798 PHYS/QHP OP CAR RHAB W/ECG: CPT

## 2025-07-06 ENCOUNTER — HEALTH MAINTENANCE LETTER (OUTPATIENT)
Age: 65
End: 2025-07-06

## 2025-07-16 ENCOUNTER — HOSPITAL ENCOUNTER (OUTPATIENT)
Dept: CARDIAC REHAB | Facility: CLINIC | Age: 65
Discharge: HOME | End: 2025-07-16
Attending: FAMILY MEDICINE
Payer: COMMERCIAL

## 2025-07-16 PROCEDURE — 93797 PHYS/QHP OP CAR RHAB WO ECG: CPT

## 2025-07-18 ENCOUNTER — HOSPITAL ENCOUNTER (OUTPATIENT)
Dept: CARDIAC REHAB | Facility: CLINIC | Age: 65
Discharge: HOME | End: 2025-07-18
Attending: FAMILY MEDICINE
Payer: COMMERCIAL

## 2025-07-18 PROCEDURE — 93798 PHYS/QHP OP CAR RHAB W/ECG: CPT

## 2025-07-23 ENCOUNTER — HOSPITAL ENCOUNTER (OUTPATIENT)
Dept: CARDIAC REHAB | Facility: CLINIC | Age: 65
Discharge: HOME | End: 2025-07-23
Attending: FAMILY MEDICINE
Payer: COMMERCIAL

## 2025-07-23 PROCEDURE — 93798 PHYS/QHP OP CAR RHAB W/ECG: CPT

## 2025-07-25 ENCOUNTER — HOSPITAL ENCOUNTER (OUTPATIENT)
Dept: CARDIAC REHAB | Facility: CLINIC | Age: 65
Discharge: HOME | End: 2025-07-25
Attending: FAMILY MEDICINE
Payer: COMMERCIAL

## 2025-07-25 PROCEDURE — 93798 PHYS/QHP OP CAR RHAB W/ECG: CPT

## 2025-07-30 ENCOUNTER — HOSPITAL ENCOUNTER (OUTPATIENT)
Dept: CARDIAC REHAB | Facility: CLINIC | Age: 65
Discharge: HOME | End: 2025-07-30
Attending: FAMILY MEDICINE
Payer: COMMERCIAL

## 2025-07-30 PROCEDURE — 93798 PHYS/QHP OP CAR RHAB W/ECG: CPT

## 2025-08-01 ENCOUNTER — HOSPITAL ENCOUNTER (OUTPATIENT)
Dept: CARDIAC REHAB | Facility: CLINIC | Age: 65
Discharge: HOME | End: 2025-08-01
Attending: FAMILY MEDICINE
Payer: COMMERCIAL

## 2025-08-01 PROCEDURE — 93798 PHYS/QHP OP CAR RHAB W/ECG: CPT

## 2025-08-06 ENCOUNTER — HOSPITAL ENCOUNTER (OUTPATIENT)
Dept: CARDIAC REHAB | Facility: CLINIC | Age: 65
Discharge: HOME | End: 2025-08-06
Attending: FAMILY MEDICINE
Payer: COMMERCIAL

## 2025-08-06 PROCEDURE — 93798 PHYS/QHP OP CAR RHAB W/ECG: CPT

## 2025-08-08 ENCOUNTER — HOSPITAL ENCOUNTER (OUTPATIENT)
Dept: CARDIAC REHAB | Facility: CLINIC | Age: 65
Discharge: HOME | End: 2025-08-08
Attending: FAMILY MEDICINE
Payer: COMMERCIAL

## 2025-08-08 PROCEDURE — 93798 PHYS/QHP OP CAR RHAB W/ECG: CPT

## 2025-08-13 ENCOUNTER — HOSPITAL ENCOUNTER (OUTPATIENT)
Dept: CARDIAC REHAB | Facility: CLINIC | Age: 65
Discharge: HOME | End: 2025-08-13
Attending: FAMILY MEDICINE
Payer: COMMERCIAL

## 2025-08-13 PROCEDURE — 93798 PHYS/QHP OP CAR RHAB W/ECG: CPT

## 2025-08-15 ENCOUNTER — HOSPITAL ENCOUNTER (OUTPATIENT)
Dept: CARDIAC REHAB | Facility: CLINIC | Age: 65
Discharge: HOME | End: 2025-08-15
Attending: FAMILY MEDICINE
Payer: COMMERCIAL

## 2025-08-15 PROCEDURE — 93798 PHYS/QHP OP CAR RHAB W/ECG: CPT

## 2025-08-20 ENCOUNTER — HOSPITAL ENCOUNTER (OUTPATIENT)
Dept: CARDIAC REHAB | Facility: CLINIC | Age: 65
Discharge: HOME | End: 2025-08-20
Attending: FAMILY MEDICINE
Payer: COMMERCIAL

## 2025-08-20 PROCEDURE — 93798 PHYS/QHP OP CAR RHAB W/ECG: CPT

## 2025-08-22 ENCOUNTER — HOSPITAL ENCOUNTER (OUTPATIENT)
Dept: CARDIAC REHAB | Facility: CLINIC | Age: 65
Discharge: HOME | End: 2025-08-22
Attending: FAMILY MEDICINE
Payer: COMMERCIAL

## 2025-08-22 PROCEDURE — 93798 PHYS/QHP OP CAR RHAB W/ECG: CPT

## 2025-08-27 ENCOUNTER — HOSPITAL ENCOUNTER (OUTPATIENT)
Dept: CARDIAC REHAB | Facility: CLINIC | Age: 65
Discharge: HOME | End: 2025-08-27
Attending: FAMILY MEDICINE
Payer: COMMERCIAL

## 2025-08-27 PROCEDURE — 93798 PHYS/QHP OP CAR RHAB W/ECG: CPT

## 2025-08-29 ENCOUNTER — HOSPITAL ENCOUNTER (OUTPATIENT)
Dept: CARDIAC REHAB | Facility: CLINIC | Age: 65
Discharge: HOME | End: 2025-08-29
Attending: FAMILY MEDICINE
Payer: COMMERCIAL

## 2025-08-29 PROCEDURE — 93798 PHYS/QHP OP CAR RHAB W/ECG: CPT

## 2025-09-03 ENCOUNTER — HOSPITAL ENCOUNTER (OUTPATIENT)
Dept: CARDIAC REHAB | Facility: CLINIC | Age: 65
Discharge: HOME | End: 2025-09-03
Attending: FAMILY MEDICINE
Payer: COMMERCIAL

## 2025-09-03 PROCEDURE — 93798 PHYS/QHP OP CAR RHAB W/ECG: CPT
